# Patient Record
Sex: FEMALE | Race: WHITE | NOT HISPANIC OR LATINO | Employment: FULL TIME | URBAN - METROPOLITAN AREA
[De-identification: names, ages, dates, MRNs, and addresses within clinical notes are randomized per-mention and may not be internally consistent; named-entity substitution may affect disease eponyms.]

---

## 2024-01-04 ENCOUNTER — HOSPITAL ENCOUNTER (EMERGENCY)
Facility: MEDICAL CENTER | Age: 69
End: 2024-01-04
Attending: EMERGENCY MEDICINE
Payer: MEDICARE

## 2024-01-04 ENCOUNTER — PHARMACY VISIT (OUTPATIENT)
Dept: PHARMACY | Facility: MEDICAL CENTER | Age: 69
End: 2024-01-04
Payer: COMMERCIAL

## 2024-01-04 VITALS
RESPIRATION RATE: 20 BRPM | SYSTOLIC BLOOD PRESSURE: 122 MMHG | WEIGHT: 165.34 LBS | OXYGEN SATURATION: 93 % | TEMPERATURE: 97 F | HEART RATE: 91 BPM | BODY MASS INDEX: 26.57 KG/M2 | HEIGHT: 66 IN | DIASTOLIC BLOOD PRESSURE: 59 MMHG

## 2024-01-04 DIAGNOSIS — H66.001 ACUTE SUPPURATIVE OTITIS MEDIA OF RIGHT EAR WITHOUT SPONTANEOUS RUPTURE OF TYMPANIC MEMBRANE, RECURRENCE NOT SPECIFIED: ICD-10-CM

## 2024-01-04 DIAGNOSIS — R00.2 PALPITATIONS: ICD-10-CM

## 2024-01-04 LAB — EKG IMPRESSION: NORMAL

## 2024-01-04 PROCEDURE — 93005 ELECTROCARDIOGRAM TRACING: CPT | Performed by: EMERGENCY MEDICINE

## 2024-01-04 PROCEDURE — 93005 ELECTROCARDIOGRAM TRACING: CPT

## 2024-01-04 PROCEDURE — RXMED WILLOW AMBULATORY MEDICATION CHARGE: Performed by: EMERGENCY MEDICINE

## 2024-01-04 PROCEDURE — 99283 EMERGENCY DEPT VISIT LOW MDM: CPT

## 2024-01-04 PROCEDURE — RXOTC WILLOW AMBULATORY OTC CHARGE: Performed by: PHARMACIST

## 2024-01-04 NOTE — ED PROVIDER NOTES
Emergency Physician Note    Chief Concern:  Chief Complaint   Patient presents with    Ear Pain     Ear pain started about 4-5 days ago    Shortness of Breath     SOB started yesterday but not consistent, pt does have cardiac hx        HPI/ROS     External Records Reviewed:   Other No prior medical records available for review.    HPI:  Shayy Soliman is a 68 y.o. female who presents to the emergency department today for evaluation of right ear pain.  Additionally she had a sensation that her heart was racing earlier in the day.  About 4 days ago she developed right ear pain.  She lives in [place], and traveled to [place] because a family member was in a collision, and is now hospitalized here at [facility].  She states that she did not have any trouble on the flight, ear did not bother her significantly with the change in cabin pressure.  However this morning her pain began to significantly worsen.  She has had no drainage from the ear.  She does report a history of very well-controlled diabetes, with A1c and fasting blood glucose within normal limits.  She has had no associated fevers, no headache, no nausea or vomiting.  Additionally, she has an extensive cardiac history, with a prior diagnosis of hypertrophic cardiomyopathy, she underwent open heart surgery at [facility] 3 years ago.  While dealing with the stress of having a family member in the hospital she states that she had a few episodes where her heart felt like it was racing, this would did not have any associated chest pain or shortness of breath.  She states that when she calm down all of her symptoms resolved and she has no ongoing symptoms.  Her cardiac nurse practitioner told her that she should get the symptoms checked out if they occur, she was not initially getting come to the emergency department for this but figured since she was here having her ear evaluated she would have those symptoms evaluated as well.  She has no lower extremity edema, no  "leg swelling, no pleuritic pain, no chest pain, and is asymptomatic on arrival.      PAST MEDICAL HISTORY  Past Medical History:   Diagnosis Date    Joel syndrome     Diabetes (HCC)     Hypertension     Hypertrophic cardiomyopathy (HCC)     Prediabetes     Psychiatric disorder        SURGICAL HISTORY  History reviewed. No pertinent surgical history.    FAMILY HISTORY  History reviewed. No pertinent family history.    SOCIAL HISTORY   reports that she has never smoked. She has never used smokeless tobacco. She reports that she does not drink alcohol and does not use drugs.    CURRENT MEDICATIONS  No current outpatient medications       ALLERGIES  Keflex [cephalexin], Levaquin [levofloxacin], and Tetracycline    PHYSICAL EXAM  Vital Signs: /59   Pulse 91   Temp 36.1 °C (97 °F) (Temporal)   Resp 20   Ht 1.676 m (5' 6\")   Wt 75 kg (165 lb 5.5 oz)   SpO2 93%   BMI 26.69 kg/m²   Constitutional: Alert, no acute distress  HEENT: Left external auditory canal and tympanic membrane are normal-appearing, right external auditory canal is normal-appearing, no mastoid tenderness, no pain with manipulation of the right pinna.  Right tympanic membrane is bulging with what appears to be purulent effusion, tympanic membrane is intact.  Cardiovascular: Regular rate and rhythm, 3-6 systolic ejection murmur which patient states is present at baseline and significantly improved after open heart surgery.  Distal extremities are warm and well-perfused.  Pulmonary: Room air oxygen saturation is in the high 90s, breath sounds clear and equal bilaterally, no wheezing, no coarse breath sounds.  Abdomen: Soft, non tender, no peritoneal signs.  Skin: Warm, dry, no rashes or lesions  Musculoskeletal: No lower extremity edema present.  Neurologic: Alert, oriented, normal motor function, no speech deficits    Diagnostic Studies & Procedures    EK Lead EKG interpreted by me as noted below  Results for orders placed or performed " during the hospital encounter of 24   EKG (NOW)   Result Value Ref Range    Report       [facility] Emergency Dept.    Test Date:  2024  Pt Name:    SAMARA SOLIMAN                   Department: ER  MRN:        [id number]                      Room:  Gender:     Female                       Technician: [id number]  :        1955                   Requested By:ER TRIAGE PROTOCOL  Order #:    [id number]                    Reading MD: RAMSES MONTGOMERY MD    Measurements  Intervals                                Axis  Rate:       84                           P:          35  IL:         148                          QRS:        -49  QRSD:       111                          T:          81  QT:         409  QTc:        484    Interpretive Statements  Rate 84, sinus rhythm, no ST elevation or depression, no pathologic Wellens  like T wave inversions, no ectopy, no prior EKG available for comparison.  Electronically Signed On 2024 08:28:02 PST by RAMSES MONTGOMERY MD            Course and Medical Decision Making    ED Observation Status? No; Patient does not meet criteria for ED Observation.     Initial Assessment and Plan    Ms. Soliman presents to the emergency department today for evaluation of right ear pain as well as palpitations as documented above.  With regard to her right ear pain, physical exam is consistent with a right otitis media.  She has no mastoid tenderness, doubt mastoiditis.  No evidence of malignant otitis externa.  She is treated with Augmentin for uncomplicated otitis media.    With regard to her palpitations, I offered lab work and chest x-ray, she is asymptomatic at this time and would prefer to be discharged quickly from the emergency department so she can attend to her family member.  She states she feels well, and that she does occasionally get a sensation of palpitations without pain.    She is not currently established with a primary care clinic here in [place], for that  reason she is counseled return to the emergency department if she develops any new or worsening symptoms or has any further concerns.  She is in agreement with this plan, and will follow-up with her cardiologist, as well as primary care clinic when she returns home to [place]. Return precautions were discussed with the patient, and provided in written form with the patient's discharge instructions.     Additional Problems and Disposition    Escalation of care considered, and ultimately not performed:   1.  Due to reported palpitations, lab work and imaging including chest x-ray were offered.  However patient states that symptoms occur intermittently, have not been worsening, and would prefer not to spend the time undergoing a more thorough evaluation.  As she is currently asymptomatic at this time with no recent change in symptoms, I believe that is a reasonable plan.  She will return if her symptoms recur.    Barriers to care at this time, including but not limited to:   Patient is not established with primary care in [place] as she lives out of town, counseled to follow-up with primary care when she returns to [place] and return to the emergency department if she has any further concerns while in the area.    Decision tools and prescription drugs considered including, but not limited to:   Augmentin prescribed for treatment of otitis media    Disposition:    Patient will be discharged home in stable condition.    FOLLOW UP:  [facility], Emergency Dept  [address] [address] [phone]  Go to   If symptoms worsen      OUTPATIENT MEDICATIONS:  Discharge Medication List as of 1/4/2024  8:36 AM        START taking these medications    Details   amoxicillin-clavulanate (AUGMENTIN) 875-125 MG Tab Take 1 Tablet by mouth 2 times a day for 5 days., Disp-10 Tablet, R-0, Normal               FINAL IMPRESSION   1. Acute suppurative otitis media of right ear without spontaneous rupture  of tympanic membrane, recurrence not specified    2. Palpitations      I, Elio De Luna (Scribe), am scribing for, and in the presence of, Jayashree Orozco M.D..    Electronically signed by: Elio De Luna (Scribe), 1/4/2024    IJayashree M.D. personally performed the services described in this documentation, as scribed by Elio De Luna in my presence, and it is both accurate and complete.      The note accurately reflects work and decisions made by me.  Jayashree Orozco M.D.  1/4/2024  2:04 PM

## 2024-01-04 NOTE — ED NOTES
.  Patient to GRN 38, connected to monitor. Denied any new complaints. [place] in low position, side rail up for pt safety. Call light within reach. Up for ERP.

## 2024-01-04 NOTE — ED NOTES
Patient discharged per order. Oral and written discharge instructions reviewed. Medications sent to [facility] pharmacy. New medications reviewed. All belongings accounted for and taken with patient. Questions answered, and patient agrees with discharge plan. Encouraged to follow up with PCP. Ambulated to lobby without difficulty.

## 2024-01-04 NOTE — DISCHARGE INSTRUCTIONS
Please take the antibiotics as prescribed.  Return to the emergency department if you develop any new or worsening symptoms including fevers, worsening ear pain, redness around the ear, headaches, or if you have any further concerns.    Please return to the emergency department if you develop any further symptoms of palpitations, chest pain, shortness of breath, or if you have any further concerns.

## 2024-01-04 NOTE — ED TRIAGE NOTES
"Chief Complaint   Patient presents with    Ear Pain     Ear pain started about 4-5 days ago    Shortness of Breath     SOB started yesterday but not consistent, pt does have cardiac hx      BP (!) 186/78   Pulse 96   Temp 35.8 °C (96.5 °F) (Temporal)   Resp 18   Ht 1.676 m (5' 6\")   Wt 75 kg (165 lb 5.5 oz)   SpO2 96%   BMI 26.69 kg/m²     Pt here for above cc  Pt has never been here before, currently visiting her daughter who is currently admitted to the hospital after an MVA  Pt recently flew in from [facility]    Ear ache for a few days, on and off SOB started yesterday- just wanting to get checked out d/t cardiac hx- hypertrophic cardiomyopathy and had heart surgery last year    EKG completed in triage- denies any CP    Pt to lobby, educated on rooming process   "